# Patient Record
(demographics unavailable — no encounter records)

---
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
## 2019-09-25 NOTE — RAD
Exam:

Left wrist 3 views:



HISTORY:

Injury



Somewhat comminuted fairly markedly displaced vertically oriented fracture through the volar and radi
al aspect of the distal radius extending intra-articularly with 0.5 cm of volar displacement. Small

chip type fracture of the ulnar styloid process.

In addition there is a chip type fracture through the base of the fifth metatarsal extending into the
 hamate fifth metatarsal joint. There is also a minimally displaced oblique or spiral fracture of

the proximal fourth metatarsal shaft. Surgical clips overlie the lateral aspect of the carpal bones. 
These clips and distal radial fracture somewhat obscures the underlying scaphoid bone.



IMPRESSION:

Comminuted distal radial fracture with displacement and intra-articular extension. Fractures of the u
lnar styloid process, base of the fifth metacarpal, and the fourth metacarpal shaft.



Reported By: Zafar Richey 

Electronically Signed:  9/25/2019 10:55 AM
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
## 2019-09-26 NOTE — RAD
LEFT FINGERS 5 VIEWS:

 

HISTORY: 

Intraoperative films.

 

FINDINGS: 

This is a series of films that show screw placement across the base of the 5th and the shaft of the 4
th metacarpals.  Postop changes of the distal radius and clips over the scaphoid and trapezium are al
so noted.

 

IMPRESSION: 

Postoperative changes of the hand and wrist.

 

POS: [facility]
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
